# Patient Record
Sex: MALE | Race: WHITE | NOT HISPANIC OR LATINO | Employment: FULL TIME | URBAN - METROPOLITAN AREA
[De-identification: names, ages, dates, MRNs, and addresses within clinical notes are randomized per-mention and may not be internally consistent; named-entity substitution may affect disease eponyms.]

---

## 2019-01-24 ENCOUNTER — OFFICE VISIT (OUTPATIENT)
Dept: GASTROENTEROLOGY | Facility: CLINIC | Age: 29
End: 2019-01-24

## 2019-01-24 VITALS
HEIGHT: 65 IN | BODY MASS INDEX: 40.85 KG/M2 | DIASTOLIC BLOOD PRESSURE: 74 MMHG | WEIGHT: 245.2 LBS | SYSTOLIC BLOOD PRESSURE: 126 MMHG

## 2019-01-24 DIAGNOSIS — K62.89 ANAL OR RECTAL PAIN: Primary | ICD-10-CM

## 2019-01-24 DIAGNOSIS — K63.5 POLYP OF COLON, UNSPECIFIED PART OF COLON, UNSPECIFIED TYPE: ICD-10-CM

## 2019-01-24 DIAGNOSIS — K62.5 RECTAL BLEEDING: ICD-10-CM

## 2019-01-24 PROCEDURE — 99203 OFFICE O/P NEW LOW 30 MIN: CPT | Performed by: INTERNAL MEDICINE

## 2019-01-24 RX ORDER — ATORVASTATIN CALCIUM 20 MG/1
TABLET, FILM COATED ORAL
COMMUNITY
Start: 2019-01-19

## 2019-01-24 RX ORDER — ASPIRIN 81 MG/1
TABLET, COATED ORAL
COMMUNITY
Start: 2019-01-18

## 2019-01-24 RX ORDER — BUPROPION HYDROCHLORIDE 150 MG/1
TABLET ORAL
COMMUNITY
Start: 2019-01-19

## 2019-01-24 RX ORDER — OMEPRAZOLE 40 MG/1
CAPSULE, DELAYED RELEASE ORAL
COMMUNITY
Start: 2017-08-01

## 2019-01-24 RX ORDER — LISINOPRIL AND HYDROCHLOROTHIAZIDE 12.5; 1 MG/1; MG/1
TABLET ORAL
COMMUNITY
Start: 2017-12-06

## 2019-01-24 RX ORDER — FENOFIBRATE 160 MG/1
160 TABLET ORAL DAILY
COMMUNITY
End: 2020-08-03

## 2019-01-24 RX ORDER — SODIUM, POTASSIUM,MAG SULFATES 17.5-3.13G
1 SOLUTION, RECONSTITUTED, ORAL ORAL EVERY 12 HOURS
Qty: 2 BOTTLE | Refills: 0 | Status: ON HOLD | OUTPATIENT
Start: 2019-01-24 | End: 2019-02-22

## 2019-01-24 RX ORDER — SUMATRIPTAN 100 MG/1
100 TABLET, FILM COATED ORAL
COMMUNITY

## 2019-01-24 RX ORDER — METFORMIN HYDROCHLORIDE 500 MG/1
TABLET, EXTENDED RELEASE ORAL
COMMUNITY
Start: 2019-01-18

## 2019-01-24 NOTE — PROGRESS NOTES
PATIENT INFORMATION  Rajinder Velasquez-Buck       - 1990    CHIEF COMPLAINT  Chief Complaint   Patient presents with   • Rectal Bleeding   • Rectal Pain       HISTORY OF PRESENT ILLNESS  HPI    27 yo with history of colon polyps in 2010, maternal grandmother with colon cancer. He has been having issues with rectal pain and bleeding intermittently for years. Symptoms worse with sitting . He has tried otc prep.H and wipes with little help.  BM are daily.  Severity is moderate   He was diagnosed with type 2 diabetes for 3 years  No family history of uc or crohns.  REVIEW OF SYSTEMS  Review of Systems   Respiratory: Positive for apnea.    Gastrointestinal: Positive for anal bleeding and rectal pain.   Neurological: Positive for headaches.   All other systems reviewed and are negative.        ACTIVE PROBLEMS  There are no active problems to display for this patient.        PAST MEDICAL HISTORY  Past Medical History:   Diagnosis Date   • Diabetes mellitus (CMS/HCC)    • GERD (gastroesophageal reflux disease)    • Hyperlipidemia    • Hypertension          SURGICAL HISTORY  Past Surgical History:   Procedure Laterality Date   • COLONOSCOPY     • HERNIA REPAIR           FAMILY HISTORY  Family History   Problem Relation Age of Onset   • Colon cancer Maternal Grandfather          SOCIAL HISTORY  Social History     Occupational History   • Not on file   Tobacco Use   • Smoking status: Never Smoker   • Smokeless tobacco: Never Used   Substance and Sexual Activity   • Alcohol use: No     Frequency: Never   • Drug use: No   • Sexual activity: Not on file       Debilities/Disabilities Identified: None    Emotional Behavior: Appropriate    CURRENT MEDICATIONS    Current Outpatient Medications:   •  fenofibrate 160 MG tablet, Take 160 mg by mouth Daily., Disp: , Rfl:   •  Insulin Degludec-Liraglutide (XULTOPHY) 100-3.6 UNIT-MG/ML solution pen-injector, Inject  under the skin into the appropriate area as directed., Disp: ,  "Rfl:   •  lisinopril-hydrochlorothiazide (PRINZIDE,ZESTORETIC) 10-12.5 MG per tablet, lisinopril 10 mg-hydrochlorothiazide 12.5 mg tablet TAKE 1 TABLET BY MOUTH ONE TIME A DAY, Disp: , Rfl:   •  omeprazole (priLOSEC) 40 MG capsule, omeprazole 40 mg capsule,delayed release TAKE 1 CAPSULE BY MOUTH ONE TIME A DAY, Disp: , Rfl:   •  SUMAtriptan (IMITREX) 100 MG tablet, Take one tablet at onset of headache. May repeat dose one time in 2 hours if headache not relieved. Take one tablet at onset of headache. May repeat dose one time in 2 hours if headache not relieved., Disp: , Rfl:   •  ASPIRIN LOW DOSE 81 MG EC tablet, , Disp: , Rfl:   •  atorvastatin (LIPITOR) 20 MG tablet, , Disp: , Rfl:   •  buPROPion XL (WELLBUTRIN XL) 150 MG 24 hr tablet, , Disp: , Rfl:   •  hydrocortisone-pramoxine (ANALPRAM HC) 2.5-1 % rectal cream, Insert  into the rectum 3 (Three) Times a Day., Disp: 30 g, Rfl: 1  •  metFORMIN ER (GLUCOPHAGE-XR) 500 MG 24 hr tablet, , Disp: , Rfl:     ALLERGIES  Erythromycin    VITALS  Vitals:    01/24/19 1200   BP: 126/74   Weight: 111 kg (245 lb 3.2 oz)   Height: 165.1 cm (65\")       LAST RESULTS   No results found for any previous visit.     No results found.    PHYSICAL EXAM  Physical Exam   Constitutional: He is oriented to person, place, and time. He appears well-developed and well-nourished. No distress.   HENT:   Head: Normocephalic and atraumatic.   Eyes: EOM are normal. Pupils are equal, round, and reactive to light.   Neck: Neck supple. No tracheal deviation present.   Cardiovascular: Normal rate, regular rhythm, normal heart sounds and intact distal pulses. Exam reveals no gallop and no friction rub.   No murmur heard.  Pulmonary/Chest: Effort normal and breath sounds normal. No respiratory distress. He has no wheezes. He has no rales. He exhibits no tenderness.   Abdominal: Soft. Bowel sounds are normal. He exhibits no distension. There is no tenderness. There is no rebound and no guarding. "   Musculoskeletal: He exhibits no edema.   Lymphadenopathy:     He has no cervical adenopathy.   Neurological: He is alert and oriented to person, place, and time.   Skin: Skin is warm. He is not diaphoretic. No erythema.   Psychiatric: He has a normal mood and affect. His behavior is normal. Judgment and thought content normal.   Nursing note and vitals reviewed.      ASSESSMENT  Diagnoses and all orders for this visit:    Anal or rectal pain  -     Case Request; Standing  -     Case Request    Rectal bleeding  -     Case Request; Standing  -     Case Request    Polyp of colon, unspecified part of colon, unspecified type  -     Case Request; Standing  -     Case Request    Other orders  -     atorvastatin (LIPITOR) 20 MG tablet;   -     ASPIRIN LOW DOSE 81 MG EC tablet;   -     buPROPion XL (WELLBUTRIN XL) 150 MG 24 hr tablet;   -     lisinopril-hydrochlorothiazide (PRINZIDE,ZESTORETIC) 10-12.5 MG per tablet; lisinopril 10 mg-hydrochlorothiazide 12.5 mg tablet TAKE 1 TABLET BY MOUTH ONE TIME A DAY  -     metFORMIN ER (GLUCOPHAGE-XR) 500 MG 24 hr tablet;   -     omeprazole (priLOSEC) 40 MG capsule; omeprazole 40 mg capsule,delayed release TAKE 1 CAPSULE BY MOUTH ONE TIME A DAY  -     SUMAtriptan (IMITREX) 100 MG tablet; Take one tablet at onset of headache. May repeat dose one time in 2 hours if headache not relieved. Take one tablet at onset of headache. May repeat dose one time in 2 hours if headache not relieved.  -     fenofibrate 160 MG tablet; Take 160 mg by mouth Daily.  -     Insulin Degludec-Liraglutide (XULTOPHY) 100-3.6 UNIT-MG/ML solution pen-injector; Inject  under the skin into the appropriate area as directed.  -     hydrocortisone-pramoxine (ANALPRAM HC) 2.5-1 % rectal cream; Insert  into the rectum 3 (Three) Times a Day.  -     Follow Anesthesia Guidelines / Standing Orders; Future  -     Implement Anesthesia orders day of procedure.; Standing  -     Obtain informed consent;  Standing          PLAN  No Follow-up on file.      High fiber diet discussed.   Water  rx sent for analpram        Indications, risks and procedure were discussed with the patient, including but not limited to, bleeding, infection, possibility of perforation and possible polypectomy. All of the patient's questions were answered, and signed informed consent was obtained and placed on the chart.

## 2019-02-21 ENCOUNTER — ANESTHESIA EVENT (OUTPATIENT)
Dept: PERIOP | Facility: HOSPITAL | Age: 29
End: 2019-02-21

## 2019-02-22 ENCOUNTER — ANESTHESIA (OUTPATIENT)
Dept: PERIOP | Facility: HOSPITAL | Age: 29
End: 2019-02-22

## 2019-02-22 ENCOUNTER — HOSPITAL ENCOUNTER (OUTPATIENT)
Facility: HOSPITAL | Age: 29
Setting detail: HOSPITAL OUTPATIENT SURGERY
Discharge: HOME | End: 2019-02-22
Attending: INTERNAL MEDICINE | Admitting: INTERNAL MEDICINE

## 2019-02-22 VITALS
DIASTOLIC BLOOD PRESSURE: 74 MMHG | SYSTOLIC BLOOD PRESSURE: 118 MMHG | OXYGEN SATURATION: 95 % | HEIGHT: 65 IN | HEART RATE: 85 BPM | RESPIRATION RATE: 16 BRPM | TEMPERATURE: 98.3 F | BODY MASS INDEX: 40.29 KG/M2 | WEIGHT: 241.8 LBS

## 2019-02-22 DIAGNOSIS — K63.5 POLYP OF COLON, UNSPECIFIED PART OF COLON, UNSPECIFIED TYPE: ICD-10-CM

## 2019-02-22 DIAGNOSIS — K62.89 ANAL OR RECTAL PAIN: ICD-10-CM

## 2019-02-22 DIAGNOSIS — K62.5 RECTAL BLEEDING: ICD-10-CM

## 2019-02-22 LAB
GLUCOSE BLDC GLUCOMTR-MCNC: 119 MG/DL (ref 70–130)
POTASSIUM BLD-SCNC: 4 MMOL/L (ref 3.5–5.2)

## 2019-02-22 PROCEDURE — 45380 COLONOSCOPY AND BIOPSY: CPT | Performed by: INTERNAL MEDICINE

## 2019-02-22 PROCEDURE — 84132 ASSAY OF SERUM POTASSIUM: CPT | Performed by: INTERNAL MEDICINE

## 2019-02-22 PROCEDURE — 25010000002 PROPOFOL 10 MG/ML EMULSION: Performed by: NURSE ANESTHETIST, CERTIFIED REGISTERED

## 2019-02-22 PROCEDURE — 82962 GLUCOSE BLOOD TEST: CPT

## 2019-02-22 PROCEDURE — 88305 TISSUE EXAM BY PATHOLOGIST: CPT | Performed by: INTERNAL MEDICINE

## 2019-02-22 RX ORDER — LIDOCAINE HYDROCHLORIDE 10 MG/ML
INJECTION, SOLUTION INFILTRATION; PERINEURAL AS NEEDED
Status: DISCONTINUED | OUTPATIENT
Start: 2019-02-22 | End: 2019-02-22 | Stop reason: SURG

## 2019-02-22 RX ORDER — SODIUM CHLORIDE, SODIUM LACTATE, POTASSIUM CHLORIDE, CALCIUM CHLORIDE 600; 310; 30; 20 MG/100ML; MG/100ML; MG/100ML; MG/100ML
9 INJECTION, SOLUTION INTRAVENOUS CONTINUOUS
Status: DISCONTINUED | OUTPATIENT
Start: 2019-02-22 | End: 2019-02-22 | Stop reason: HOSPADM

## 2019-02-22 RX ORDER — IBUPROFEN 800 MG/1
800 TABLET ORAL ONCE AS NEEDED
Status: COMPLETED | OUTPATIENT
Start: 2019-02-22 | End: 2019-02-22

## 2019-02-22 RX ORDER — SODIUM CHLORIDE 0.9 % (FLUSH) 0.9 %
3 SYRINGE (ML) INJECTION EVERY 12 HOURS SCHEDULED
Status: DISCONTINUED | OUTPATIENT
Start: 2019-02-22 | End: 2019-02-22 | Stop reason: HOSPADM

## 2019-02-22 RX ORDER — GLYCOPYRROLATE 0.2 MG/ML
INJECTION INTRAMUSCULAR; INTRAVENOUS AS NEEDED
Status: DISCONTINUED | OUTPATIENT
Start: 2019-02-22 | End: 2019-02-22 | Stop reason: SURG

## 2019-02-22 RX ORDER — SODIUM CHLORIDE 0.9 % (FLUSH) 0.9 %
1-10 SYRINGE (ML) INJECTION AS NEEDED
Status: DISCONTINUED | OUTPATIENT
Start: 2019-02-22 | End: 2019-02-22 | Stop reason: HOSPADM

## 2019-02-22 RX ORDER — PROPOFOL 10 MG/ML
VIAL (ML) INTRAVENOUS CONTINUOUS PRN
Status: DISCONTINUED | OUTPATIENT
Start: 2019-02-22 | End: 2019-02-22 | Stop reason: SURG

## 2019-02-22 RX ORDER — SODIUM CHLORIDE 9 MG/ML
40 INJECTION, SOLUTION INTRAVENOUS AS NEEDED
Status: DISCONTINUED | OUTPATIENT
Start: 2019-02-22 | End: 2019-02-22 | Stop reason: HOSPADM

## 2019-02-22 RX ORDER — LIDOCAINE HYDROCHLORIDE 10 MG/ML
0.5 INJECTION, SOLUTION EPIDURAL; INFILTRATION; INTRACAUDAL; PERINEURAL ONCE AS NEEDED
Status: COMPLETED | OUTPATIENT
Start: 2019-02-22 | End: 2019-02-22

## 2019-02-22 RX ORDER — PROPOFOL 10 MG/ML
VIAL (ML) INTRAVENOUS AS NEEDED
Status: DISCONTINUED | OUTPATIENT
Start: 2019-02-22 | End: 2019-02-22 | Stop reason: SURG

## 2019-02-22 RX ADMIN — GLYCOPYRROLATE 0.1 MG: 0.2 INJECTION INTRAMUSCULAR; INTRAVENOUS at 11:00

## 2019-02-22 RX ADMIN — PROPOFOL 40 MG: 10 INJECTION, EMULSION INTRAVENOUS at 11:29

## 2019-02-22 RX ADMIN — SODIUM CHLORIDE, POTASSIUM CHLORIDE, SODIUM LACTATE AND CALCIUM CHLORIDE 9 ML/HR: 600; 310; 30; 20 INJECTION, SOLUTION INTRAVENOUS at 10:02

## 2019-02-22 RX ADMIN — PROPOFOL 100 MCG/KG/MIN: 10 INJECTION, EMULSION INTRAVENOUS at 11:01

## 2019-02-22 RX ADMIN — LIDOCAINE HYDROCHLORIDE 0.5 ML: 10 INJECTION, SOLUTION EPIDURAL; INFILTRATION; INTRACAUDAL; PERINEURAL at 10:02

## 2019-02-22 RX ADMIN — PROPOFOL 50 MG: 10 INJECTION, EMULSION INTRAVENOUS at 11:14

## 2019-02-22 RX ADMIN — IBUPROFEN 800 MG: 800 TABLET ORAL at 12:31

## 2019-02-22 RX ADMIN — PROPOFOL 40 MG: 10 INJECTION, EMULSION INTRAVENOUS at 11:24

## 2019-02-22 RX ADMIN — PROPOFOL 40 MG: 10 INJECTION, EMULSION INTRAVENOUS at 11:34

## 2019-02-22 RX ADMIN — LIDOCAINE HYDROCHLORIDE 50 MG: 10 INJECTION, SOLUTION INFILTRATION; PERINEURAL at 11:00

## 2019-02-22 RX ADMIN — PROPOFOL 50 MG: 10 INJECTION, EMULSION INTRAVENOUS at 11:07

## 2019-02-22 RX ADMIN — PROPOFOL 50 MG: 10 INJECTION, EMULSION INTRAVENOUS at 11:18

## 2019-02-22 RX ADMIN — PROPOFOL 50 MG: 10 INJECTION, EMULSION INTRAVENOUS at 11:01

## 2019-02-22 NOTE — ANESTHESIA POSTPROCEDURE EVALUATION
Patient: Rajinder Menjivar    Procedure Summary     Date:  02/22/19 Room / Location:  [facility] ENDOSCOPY 2 /  [facility]    Anesthesia Start:  1059 Anesthesia Stop:  1142    Procedure:  COLONOSCOPY with polypectomy (N/A ) Diagnosis:       Anal or rectal pain      Rectal bleeding      Polyp of colon, unspecified part of colon, unspecified type      (Anal or rectal pain [K62.89])      (Rectal bleeding [K62.5])      (Polyp of colon, unspecified part of colon, unspecified type [K63.5])    Surgeon:  Kiki West MD Provider:  Shahbaz Yepez CRNA    Anesthesia Type:  MAC ASA Status:  3          Anesthesia Type: MAC  Last vitals  BP   105/92 (02/22/19 0929)   Temp   98.3 °F (36.8 °C) (02/22/19 0929)   Pulse   84 (02/22/19 0929)   Resp   16 (02/22/19 0929)     SpO2   96 % (02/22/19 0929)     Post Anesthesia Care and Evaluation    Patient location during evaluation: PHASE II  Patient participation: complete - patient participated  Level of consciousness: awake and alert  Pain score: 0  Pain management: adequate  Airway patency: patent  Anesthetic complications: No anesthetic complications  PONV Status: none  Cardiovascular status: acceptable  Respiratory status: acceptable  Hydration status: acceptable

## 2019-02-22 NOTE — ANESTHESIA PREPROCEDURE EVALUATION
Anesthesia Evaluation     Patient summary reviewed and Nursing notes reviewed   NPO Solid Status: > 8 hours  NPO Liquid Status: > 8 hours           Airway   Mallampati: III  TM distance: >3 FB  Neck ROM: full  Possible difficult intubation and Large neck circumference  Dental - normal exam     Pulmonary     breath sounds clear to auscultation  (+) sleep apnea ( nightly cpap) on CPAP,   Cardiovascular   Exercise tolerance: good (4-7 METS)    Rhythm: regular  Rate: normal    (+) hypertension well controlled, hyperlipidemia,       Neuro/Psych  (+) psychiatric history Anxiety,     GI/Hepatic/Renal/Endo    (+)  GERD well controlled, GI bleeding ( rectal bleeding), diabetes mellitus type 2 well controlled,     Musculoskeletal (-) negative ROS    Abdominal    Substance History - negative use     OB/GYN negative ob/gyn ROS         Other - negative ROS           Phys Exam Other: Full beard                Anesthesia Plan    ASA 3     MAC     intravenous induction   Anesthetic plan, all risks, benefits, and alternatives have been provided, discussed and informed consent has been obtained with: patient.  Use of blood products discussed with patient  Consented to blood products.

## 2019-02-22 NOTE — OP NOTE
Patient Name:  Rajinder Menjivar  YOB: 1990    Date of Procedure:  2/22/2019    Procedure Performed: Colonoscopy  Indications: Rectal bleeding, rectal pain    Pre-op Diagnosis:   Anal or rectal pain [K62.89]  Rectal bleeding [K62.5]  Polyp of colon, unspecified part of colon, unspecified type [K63.5]    Post-Op Diagnosis Codes:     * Anal or rectal pain [K62.89]     * Rectal bleeding [K62.5]     * Polyp of colon, unspecified part of colon, unspecified type [K63.5]         Staff:  Surgeon(s):  Kiki West MD         Consent: Procedure colonoscopy indications, risks and procedure were discussed with the patient, including but not limited to, bleeding, infection, possibility of perforation and possible polypectomy. All of the patient's questions were answered, and signed informed consent was obtained and placed on the chart.      Sedation: Sedation was provided by anesthesia.      Estimated Blood Loss: minimal    Specimens:   ID Type Source Tests Collected by Time   A : Ascending colon polyp x1 Polyp Large Intestine, Right / Ascending Colon TISSUE PATHOLOGY EXAM Kiki West MD 2/22/2019 1119   B : Sigmoid Colon Polyp x1 Polyp Large Intestine, Sigmoid Colon TISSUE PATHOLOGY EXAM Kiki West MD 2/22/2019 1132         Description of Procedure:   After excellent sedation a digital rectal examination is performed the colonoscope was inserted into the rectum passed to the cecum.  The cecum was identified by both the ileocecal valve and the appendiceal orifice.  The overall bowel preparation was fair.  Upon withdrawal the scope careful examination of mucosa was made.  Pertinent findings include a single ascending colon polyp that was about 3-4 mm sessile removed using forceps and a single sigmoid polyp that was about 3 mm sessile removed using forceps.  Scope was slowly withdrawn to the distal sigmoid colon.  He had some excessive coughing there at that time such that the scope was pushed  out of the patient and landed on the floor.  So another scope was then used to complete the procedure.  The scope was slowly withdrawn to the rectum retroflex or internal hemorrhoids are noted.  The scope was straightened and then slowly withdrawn out throughout the anal canal.  Upon external examination a bleeding anal fissure is noted at the 12 o'clock position.        Findings:   Anal fissure  Internal hemorrhoids  Colon polyps removed using forceps  We will await biopsy results  We will send prescription to compound pharmacy for anal fissure.  Sitz baths and stool softeners are recommended.  He will see me back in the office in follow-up in 2-3 weeks.  A repeat colonoscopy will be recommended for 5 years for surveillance purposes    Complications: None        Kiki West MD     Date: 2/22/2019  Time: 12:00 PM

## 2019-02-22 NOTE — H&P
PATIENT INFORMATION  Rajinder Gold Ron-Buck         - 1990     CHIEF COMPLAINT      Chief Complaint   Patient presents with   • Rectal Bleeding   • Rectal Pain         HISTORY OF PRESENT ILLNESS  HPI     27 yo with history of colon polyps in 2010, maternal grandmother with colon cancer. He has been having issues with rectal pain and bleeding intermittently for years. Symptoms worse with sitting . He has tried otc prep.H and wipes with little help.  BM are daily.  Severity is moderate   He was diagnosed with type 2 diabetes for 3 years  No family history of uc or crohns.  REVIEW OF SYSTEMS  Review of Systems   Respiratory: Positive for apnea.    Gastrointestinal: Positive for anal bleeding and rectal pain.   Neurological: Positive for headaches.   All other systems reviewed and are negative.           ACTIVE PROBLEMS  There are no active problems to display for this patient.           PAST MEDICAL HISTORY  Medical History        Past Medical History:   Diagnosis Date   • Diabetes mellitus (CMS/HCC)     • GERD (gastroesophageal reflux disease)     • Hyperlipidemia     • Hypertension                 SURGICAL HISTORY  Surgical History         Past Surgical History:   Procedure Laterality Date   • COLONOSCOPY       • HERNIA REPAIR                   FAMILY HISTORY        Family History   Problem Relation Age of Onset   • Colon cancer Maternal Grandfather              SOCIAL HISTORY  Social History            Occupational History   • Not on file   Tobacco Use   • Smoking status: Never Smoker   • Smokeless tobacco: Never Used   Substance and Sexual Activity   • Alcohol use: No       Frequency: Never   • Drug use: No   • Sexual activity: Not on file         Debilities/Disabilities Identified: None     Emotional Behavior: Appropriate     CURRENT MEDICATIONS     Current Outpatient Medications:   •  fenofibrate 160 MG tablet, Take 160 mg by mouth Daily., Disp: , Rfl:   •  Insulin Degludec-Liraglutide (XULTOPHY) 100-3.6  "UNIT-MG/ML solution pen-injector, Inject  under the skin into the appropriate area as directed., Disp: , Rfl:   •  lisinopril-hydrochlorothiazide (PRINZIDE,ZESTORETIC) 10-12.5 MG per tablet, lisinopril 10 mg-hydrochlorothiazide 12.5 mg tablet TAKE 1 TABLET BY MOUTH ONE TIME A DAY, Disp: , Rfl:   •  omeprazole (priLOSEC) 40 MG capsule, omeprazole 40 mg capsule,delayed release TAKE 1 CAPSULE BY MOUTH ONE TIME A DAY, Disp: , Rfl:   •  SUMAtriptan (IMITREX) 100 MG tablet, Take one tablet at onset of headache. May repeat dose one time in 2 hours if headache not relieved. Take one tablet at onset of headache. May repeat dose one time in 2 hours if headache not relieved., Disp: , Rfl:   •  ASPIRIN LOW DOSE 81 MG EC tablet, , Disp: , Rfl:   •  atorvastatin (LIPITOR) 20 MG tablet, , Disp: , Rfl:   •  buPROPion XL (WELLBUTRIN XL) 150 MG 24 hr tablet, , Disp: , Rfl:   •  hydrocortisone-pramoxine (ANALPRAM HC) 2.5-1 % rectal cream, Insert  into the rectum 3 (Three) Times a Day., Disp: 30 g, Rfl: 1  •  metFORMIN ER (GLUCOPHAGE-XR) 500 MG 24 hr tablet, , Disp: , Rfl:      ALLERGIES  Erythromycin     VITALS  /92 (BP Location: Left arm, Patient Position: Lying)   Pulse 84   Temp 98.3 °F (36.8 °C) (Oral)   Resp 16   Ht 165.1 cm (65\")   Wt 110 kg (241 lb 12.8 oz)   SpO2 96%   BMI 40.24 kg/m²               LAST RESULTS           No results found for any previous visit.      No results found.     PHYSICAL EXAM  Physical Exam   Constitutional: He is oriented to person, place, and time. He appears well-developed and well-nourished. No distress.   HENT:   Head: Normocephalic and atraumatic.   Eyes: EOM are normal. Pupils are equal, round, and reactive to light.   Neck: Neck supple. No tracheal deviation present.   Cardiovascular: Normal rate, regular rhythm, normal heart sounds and intact distal pulses. Exam reveals no gallop and no friction rub.   No murmur heard.  Pulmonary/Chest: Effort normal and breath sounds normal. No " respiratory distress. He has no wheezes. He has no rales. He exhibits no tenderness.   Abdominal: Soft. Bowel sounds are normal. He exhibits no distension. There is no tenderness. There is no rebound and no guarding.   Musculoskeletal: He exhibits no edema.   Lymphadenopathy:     He has no cervical adenopathy.   Neurological: He is alert and oriented to person, place, and time.   Skin: Skin is warm. He is not diaphoretic. No erythema.   Psychiatric: He has a normal mood and affect. His behavior is normal. Judgment and thought content normal.   Nursing note and vitals reviewed.        ASSESSMENT  Diagnoses and all orders for this visit:     Anal or rectal pain  -     Case Request; Standing  -     Case Request     Rectal bleeding  -     Case Request; Standing  -     Case Request     Polyp of colon, unspecified part of colon, unspecified type  -     Case Request; Standing  -     Case Request     Other orders  -     atorvastatin (LIPITOR) 20 MG tablet;   -     ASPIRIN LOW DOSE 81 MG EC tablet;   -     buPROPion XL (WELLBUTRIN XL) 150 MG 24 hr tablet;   -     lisinopril-hydrochlorothiazide (PRINZIDE,ZESTORETIC) 10-12.5 MG per tablet; lisinopril 10 mg-hydrochlorothiazide 12.5 mg tablet TAKE 1 TABLET BY MOUTH ONE TIME A DAY  -     metFORMIN ER (GLUCOPHAGE-XR) 500 MG 24 hr tablet;   -     omeprazole (priLOSEC) 40 MG capsule; omeprazole 40 mg capsule,delayed release TAKE 1 CAPSULE BY MOUTH ONE TIME A DAY  -     SUMAtriptan (IMITREX) 100 MG tablet; Take one tablet at onset of headache. May repeat dose one time in 2 hours if headache not relieved. Take one tablet at onset of headache. May repeat dose one time in 2 hours if headache not relieved.  -     fenofibrate 160 MG tablet; Take 160 mg by mouth Daily.  -     Insulin Degludec-Liraglutide (XULTOPHY) 100-3.6 UNIT-MG/ML solution pen-injector; Inject  under the skin into the appropriate area as directed.  -     hydrocortisone-pramoxine (ANALPRAM HC) 2.5-1 % rectal cream;  Insert  into the rectum 3 (Three) Times a Day.  -     Follow Anesthesia Guidelines / Standing Orders; Future  -     Implement Anesthesia orders day of procedure.; Standing  -     Obtain informed consent; Standing              PLAN  No Follow-up on file.        High fiber diet discussed.   Water  rx sent for analpram           Indications, risks and procedure were discussed with the patient, including but not limited to, bleeding, infection, possibility of perforation and possible polypectomy. All of the patient's questions were answered, and signed informed consent was obtained and placed on the chart.

## 2019-02-26 LAB
CYTO UR: NORMAL
LAB AP CASE REPORT: NORMAL
PATH REPORT.FINAL DX SPEC: NORMAL
PATH REPORT.GROSS SPEC: NORMAL

## 2019-05-22 ENCOUNTER — OFFICE VISIT (OUTPATIENT)
Dept: GASTROENTEROLOGY | Facility: CLINIC | Age: 29
End: 2019-05-22

## 2019-05-22 VITALS
BODY MASS INDEX: 41.42 KG/M2 | SYSTOLIC BLOOD PRESSURE: 122 MMHG | DIASTOLIC BLOOD PRESSURE: 76 MMHG | HEIGHT: 65 IN | WEIGHT: 248.6 LBS

## 2019-05-22 DIAGNOSIS — K60.2 ANAL FISSURE: ICD-10-CM

## 2019-05-22 DIAGNOSIS — K63.5 POLYP OF COLON, UNSPECIFIED PART OF COLON, UNSPECIFIED TYPE: ICD-10-CM

## 2019-05-22 DIAGNOSIS — K64.8 INTERNAL HEMORRHOIDS: Primary | ICD-10-CM

## 2019-05-22 PROCEDURE — 99213 OFFICE O/P EST LOW 20 MIN: CPT | Performed by: INTERNAL MEDICINE

## 2019-05-22 RX ORDER — HYDROCORTISONE ACETATE 25 MG/1
25 SUPPOSITORY RECTAL 2 TIMES DAILY PRN
Qty: 30 SUPPOSITORY | Refills: 1 | Status: SHIPPED | OUTPATIENT
Start: 2019-05-22 | End: 2020-08-03

## 2019-05-22 NOTE — PROGRESS NOTES
PATIENT INFORMATION  Rajinder Velasquez-Buck       - 1990    CHIEF COMPLAINT  Chief Complaint   Patient presents with   • Diarrhea   • Hemorrhoids       HISTORY OF PRESENT ILLNESS  HPI    Final Diagnosis   1. Ascending Colon, Biopsy:               A. Tubular adenoma.               B. Negative for high-grade dysplasia.     2. Sigmoid Colon, Biopsy:               A. Hyperplastic polyp.     He is here today in follow-up after his colonoscopy.  He is accompanied by his partner.  In review his colonoscopy revealed evidence of internal hemorrhoids and anal fissure.  He also had several polyps removed.  Pathology as above.  One was adenomatous.  He was given a prescription for too bad cream for his anal fissure.  He states that about 1 week after being on this cream he had increasing pain and noticed a large bulge in the rectal area.  He stopped taking the cream.  He does feel better today.  He is not having any further pain or bleeding.  Bowel movements are once daily and are soft.  He states he has not been participating in anal intercourse in a long time as he has had chronic issues with hemorrhoids.     REVIEW OF SYSTEMS  Review of Systems   Gastrointestinal: Positive for anal bleeding, diarrhea and rectal pain.   Neurological: Positive for headaches.   All other systems reviewed and are negative.        ACTIVE PROBLEMS  Patient Active Problem List    Diagnosis   • Anal or rectal pain [K62.89]   • Rectal bleeding [K62.5]   • Polyp of colon [K63.5]         PAST MEDICAL HISTORY  Past Medical History:   Diagnosis Date   • Diabetes mellitus (CMS/HCC)    • GERD (gastroesophageal reflux disease)    • Hyperlipidemia    • Hypertension    • Polyp of colon 2019         SURGICAL HISTORY  Past Surgical History:   Procedure Laterality Date   • COLONOSCOPY     • COLONOSCOPY N/A 2019    Procedure: COLONOSCOPY with polypectomy;  Surgeon: Kiki West MD;  Location: [facility];  Service: Gastroenterology   •  HERNIA REPAIR     • TONSILLECTOMY     • WISDOM TOOTH EXTRACTION           FAMILY HISTORY  Family History   Problem Relation Age of Onset   • Colon cancer Maternal Grandfather          SOCIAL HISTORY  Social History     Occupational History   • Not on file   Tobacco Use   • Smoking status: Never Smoker   • Smokeless tobacco: Never Used   Substance and Sexual Activity   • Alcohol use: No     Frequency: Never   • Drug use: No   • Sexual activity: Not on file       Debilities/Disabilities Identified: None    Emotional Behavior: Appropriate    CURRENT MEDICATIONS    Current Outpatient Medications:   •  ASPIRIN LOW DOSE 81 MG EC tablet, , Disp: , Rfl:   •  atorvastatin (LIPITOR) 20 MG tablet, , Disp: , Rfl:   •  buPROPion XL (WELLBUTRIN XL) 150 MG 24 hr tablet, , Disp: , Rfl:   •  fenofibrate 160 MG tablet, Take 160 mg by mouth Daily., Disp: , Rfl:   •  hydrocortisone (ANUSOL-HC) 25 MG suppository, Insert 1 suppository into the rectum 2 (Two) Times a Day As Needed for Hemorrhoids (rectal discomfort)., Disp: 30 suppository, Rfl: 1  •  hydrocortisone-pramoxine (ANALPRAM HC) 2.5-1 % rectal cream, Insert  into the rectum 3 (Three) Times a Day., Disp: 30 g, Rfl: 1  •  Insulin Degludec-Liraglutide (XULTOPHY) 100-3.6 UNIT-MG/ML solution pen-injector, Inject  under the skin into the appropriate area as directed., Disp: , Rfl:   •  lisinopril-hydrochlorothiazide (PRINZIDE,ZESTORETIC) 10-12.5 MG per tablet, lisinopril 10 mg-hydrochlorothiazide 12.5 mg tablet TAKE 1 TABLET BY MOUTH ONE TIME A DAY, Disp: , Rfl:   •  metFORMIN ER (GLUCOPHAGE-XR) 500 MG 24 hr tablet, , Disp: , Rfl:   •  omeprazole (priLOSEC) 40 MG capsule, omeprazole 40 mg capsule,delayed release TAKE 1 CAPSULE BY MOUTH ONE TIME A DAY, Disp: , Rfl:   •  SUMAtriptan (IMITREX) 100 MG tablet, Take one tablet at onset of headache. May repeat dose one time in 2 hours if headache not relieved. Take one tablet at onset of headache. May repeat dose one time in 2 hours if  "headache not relieved., Disp: , Rfl:     ALLERGIES  Erythromycin    VITALS  Vitals:    05/22/19 1506   BP: 122/76   Weight: 113 kg (248 lb 9.6 oz)   Height: 165.1 cm (65\")       LAST RESULTS   Admission on 02/22/2019, Discharged on 02/22/2019   Component Date Value Ref Range Status   • Potassium 02/22/2019 4.0  3.5 - 5.2 mmol/L Final   • Glucose 02/22/2019 119  70 - 130 mg/dL Final   • Case Report 02/22/2019    Final                    Value:Surgical Pathology Report                         Case: [id number]                                  Authorizing Provider:  Kiki West MD         Collected:           02/22/2019 11:19 AM          Ordering Location:     [facility]   Received:            02/22/2019 12:33 PM                                 OR                                                                           Pathologist:           Ifrah Sincliar MD                                                    Specimens:   1) - Large Intestine, Right / Ascending Colon, Ascending colon polyp x1                             2) - Large Intestine, Sigmoid Colon, Sigmoid Colon Polyp x1                               • Final Diagnosis 02/22/2019    Final                    Value:This result contains rich text formatting which cannot be displayed here.   • Gross Description 02/22/2019    Final                    Value:This result contains rich text formatting which cannot be displayed here.   • Microscopic Description 02/22/2019    Final                    Value:This result contains rich text formatting which cannot be displayed here.     No results found.    PHYSICAL EXAM  Physical Exam   Constitutional: He is oriented to person, place, and time. He appears well-developed and well-nourished. No distress.   HENT:   Head: Normocephalic and atraumatic.   Eyes: EOM are normal. Pupils are equal, round, and reactive to light.   Neck: Neck supple. No tracheal deviation present.   Cardiovascular: Normal rate, " regular rhythm, normal heart sounds and intact distal pulses. Exam reveals no gallop and no friction rub.   No murmur heard.  Pulmonary/Chest: Effort normal and breath sounds normal. No respiratory distress. He has no wheezes. He has no rales. He exhibits no tenderness.   Abdominal: Soft. Bowel sounds are normal. He exhibits no distension. There is no tenderness. There is no rebound and no guarding.   Musculoskeletal: He exhibits no edema.   Lymphadenopathy:     He has no cervical adenopathy.   Neurological: He is alert and oriented to person, place, and time.   Skin: Skin is warm. He is not diaphoretic. No erythema.   Psychiatric: He has a normal mood and affect. His behavior is normal. Judgment and thought content normal.   Nursing note and vitals reviewed.      ASSESSMENT  Diagnoses and all orders for this visit:    Internal hemorrhoids    Anal fissure    Other orders  -     hydrocortisone (ANUSOL-HC) 25 MG suppository; Insert 1 suppository into the rectum 2 (Two) Times a Day As Needed for Hemorrhoids (rectal discomfort).          PLAN  No Follow-up on file.      Ok to do fiber  For now, stop 2BAD cream as symptoms have resolved   stool softeners as needed, noah gallagher    If not better, can refer to hemorrhoid clinic in [place].    Repeat colon in 5 years.    Trial of anusol hc.

## 2020-08-03 ENCOUNTER — OFFICE VISIT (OUTPATIENT)
Dept: SURGERY | Facility: CLINIC | Age: 30
End: 2020-08-03

## 2020-08-03 VITALS
TEMPERATURE: 98.2 F | RESPIRATION RATE: 16 BRPM | BODY MASS INDEX: 41.32 KG/M2 | WEIGHT: 248 LBS | HEART RATE: 80 BPM | SYSTOLIC BLOOD PRESSURE: 128 MMHG | HEIGHT: 65 IN | DIASTOLIC BLOOD PRESSURE: 74 MMHG

## 2020-08-03 DIAGNOSIS — Z86.010 HISTORY OF COLON POLYPS: ICD-10-CM

## 2020-08-03 DIAGNOSIS — K64.8 BLEEDING INTERNAL HEMORRHOIDS: Primary | ICD-10-CM

## 2020-08-03 PROCEDURE — 99203 OFFICE O/P NEW LOW 30 MIN: CPT | Performed by: SURGERY

## 2020-08-03 RX ORDER — HYDROCORTISONE 25 MG/G
CREAM TOPICAL
Qty: 30 G | Refills: 1 | Status: SHIPPED | OUTPATIENT
Start: 2020-08-03 | End: 2021-08-03

## 2020-08-03 RX ORDER — EZETIMIBE 10 MG/1
10 TABLET ORAL DAILY
COMMUNITY

## 2020-08-03 NOTE — PROGRESS NOTES
"Rajinder Gold Ron-Buck 30 y.o. male presents @ the req of [facility] for eval of hemorrhoids.  Pt reports 10 yr H/O hemorrhoids.  Pt reports they cycle from improving then get worse.  + bleeding hemorrhoids.  Pt has tried Anusol HC supp = no help.  Also tried a \"compound cream\"  that was Rx'd per PCP and states it made him sick to his stomach.     Chief Complaint   Patient presents with   • Hemorrhoids             HPI     Above-noted and agree.  This very pleasant 30-year-old male is here to discuss hemorrhoids.  He had a colonoscopy last year by Dr. Rodgers and had an adenomatous ascending colon polyp as well as a anal fissure and internal hemorrhoids.  He presents today with complaints of bleeding hemorrhoids.  He moves his bowels easily when he remembers to use his fiber but when he does not remember uses fiber he strains.  He has a family history of colon cancer.  He has never used tobacco products.  He has no other complaints.    Review of Systems   All other systems reviewed and are negative.            Current Outpatient Medications:   •  ezetimibe (ZETIA) 10 MG tablet, Take 10 mg by mouth Daily., Disp: , Rfl:   •  ASPIRIN LOW DOSE 81 MG EC tablet, , Disp: , Rfl:   •  atorvastatin (LIPITOR) 20 MG tablet, , Disp: , Rfl:   •  buPROPion XL (WELLBUTRIN XL) 150 MG 24 hr tablet, , Disp: , Rfl:   •  Hydrocortisone, Perianal, (Anusol-HC) 2.5 % rectal cream, Apply rectally 2 times daily, Disp: 30 g, Rfl: 1  •  Insulin Degludec-Liraglutide (XULTOPHY) 100-3.6 UNIT-MG/ML solution pen-injector, Inject  under the skin into the appropriate area as directed., Disp: , Rfl:   •  lisinopril-hydrochlorothiazide (PRINZIDE,ZESTORETIC) 10-12.5 MG per tablet, lisinopril 10 mg-hydrochlorothiazide 12.5 mg tablet TAKE 1 TABLET BY MOUTH ONE TIME A DAY, Disp: , Rfl:   •  metFORMIN ER (GLUCOPHAGE-XR) 500 MG 24 hr tablet, , Disp: , Rfl:   •  omeprazole (priLOSEC) 40 MG capsule, omeprazole 40 mg capsule,delayed release TAKE 1 CAPSULE " BY MOUTH ONE TIME A DAY, Disp: , Rfl:   •  SUMAtriptan (IMITREX) 100 MG tablet, Take one tablet at onset of headache. May repeat dose one time in 2 hours if headache not relieved. Take one tablet at onset of headache. May repeat dose one time in 2 hours if headache not relieved., Disp: , Rfl:         Allergies   Allergen Reactions   • Erythromycin Nausea And Vomiting           Past Medical History:   Diagnosis Date   • Diabetes mellitus (CMS/HCC)    • GERD (gastroesophageal reflux disease)    • Hyperlipidemia    • Hypertension    • Polyp of colon 1/24/2019           Past Surgical History:   Procedure Laterality Date   • COLONOSCOPY     • COLONOSCOPY N/A 2/22/2019    Procedure: COLONOSCOPY with polypectomy;  Surgeon: Kiki West MD;  Location: [facility];  Service: Gastroenterology   • HERNIA REPAIR     • TONSILLECTOMY     • WISDOM TOOTH EXTRACTION             Social History     Tobacco Use   • Smoking status: Never Smoker   • Smokeless tobacco: Never Used   Substance Use Topics   • Alcohol use: No     Frequency: Never   • Drug use: No             There is no immunization history on file for this patient.        Physical Exam   Constitutional: He is oriented to person, place, and time. He appears well-developed and well-nourished.   HENT:   Head: Normocephalic and atraumatic.   Right Ear: External ear normal.   Left Ear: External ear normal.   Cardiovascular: Normal rate and regular rhythm.   Pulmonary/Chest: Effort normal and breath sounds normal.   Abdominal: Soft. Bowel sounds are normal.   Genitourinary:   Genitourinary Comments: External anal exam is negative   Musculoskeletal: He exhibits no edema or deformity.   Neurological: He is alert and oriented to person, place, and time.   Skin: Skin is warm and dry.   Psychiatric: He has a normal mood and affect. His behavior is normal.   Nursing note and vitals reviewed.      Debilities/Disabilities Identified: None    Emotional Behavior: Appropriate      /74  "  Pulse 80   Temp 98.2 °F (36.8 °C)   Resp 16   Ht 165.1 cm (65\")   Wt 112 kg (248 lb)   BMI 41.27 kg/m²         Rajinder was seen today for hemorrhoids.    Diagnoses and all orders for this visit:    Bleeding internal hemorrhoids    History of colon polyps    Other orders  -     Hydrocortisone, Perianal, (Anusol-HC) 2.5 % rectal cream; Apply rectally 2 times daily    Since Dr. Rodgers is no longer with [facility], we will add him to our 3-year repeat colonoscopy list.  We will begin Metamucil and our conservative hemorrhoid treatments and bring him back in 2 weeks.    Thank you for allowing me to participate in the care of this interesting patient.        "

## 2020-10-05 ENCOUNTER — OFFICE VISIT (OUTPATIENT)
Dept: SURGERY | Facility: CLINIC | Age: 30
End: 2020-10-05

## 2020-10-05 VITALS
HEIGHT: 65 IN | DIASTOLIC BLOOD PRESSURE: 76 MMHG | BODY MASS INDEX: 41.32 KG/M2 | RESPIRATION RATE: 20 BRPM | SYSTOLIC BLOOD PRESSURE: 122 MMHG | HEART RATE: 72 BPM | WEIGHT: 248 LBS | TEMPERATURE: 98.2 F

## 2020-10-05 DIAGNOSIS — Z86.010 HISTORY OF COLON POLYPS: ICD-10-CM

## 2020-10-05 DIAGNOSIS — K62.5 RECTAL BLEEDING: Primary | ICD-10-CM

## 2020-10-05 PROCEDURE — 99214 OFFICE O/P EST MOD 30 MIN: CPT | Performed by: SURGERY

## 2020-10-05 NOTE — PROGRESS NOTES
Rajinder Gold Ron-Buck 30 y.o. male presents for  FU hemorrhoids.  Pt reports no improvement.  Pt would like surgery information and discuss possible surgery later due to busy schedule.       HPI   Above-noted agree.  Andre is here to discuss the THD procedure.  He is still having a lot of bleeding and tightness of the anal area.  After reviewing his records he did have a tubular adenoma removed in 2019.  We discussed repeating his colonoscopy prior to doing the THD procedure.  He is tolerating a regular diet without nausea or vomiting.  He has no chest pain or shortness of breath.  He has no fevers or chills.  He has no other complaints.      Review of Systems   All other systems reviewed and are negative.          Past Medical History:   Diagnosis Date   • Diabetes mellitus (CMS/HCC)    • GERD (gastroesophageal reflux disease)    • Hyperlipidemia    • Hypertension    • Polyp of colon 1/24/2019           Past Surgical History:   Procedure Laterality Date   • COLONOSCOPY     • COLONOSCOPY N/A 2/22/2019    Procedure: COLONOSCOPY with polypectomy;  Surgeon: Kiki West MD;  Location: [facility];  Service: Gastroenterology   • HERNIA REPAIR     • TONSILLECTOMY     • WISDOM TOOTH EXTRACTION             Physical Exam  Vitals signs and nursing note reviewed.   Constitutional:       Appearance: Normal appearance.   HENT:      Head: Normocephalic and atraumatic.   Cardiovascular:      Rate and Rhythm: Normal rate and regular rhythm.   Pulmonary:      Effort: Pulmonary effort is normal.      Breath sounds: Normal breath sounds.   Abdominal:      General: Bowel sounds are normal.      Palpations: Abdomen is soft.   Musculoskeletal:         General: No swelling or tenderness.   Skin:     General: Skin is warm and dry.   Neurological:      General: No focal deficit present.      Mental Status: He is alert and oriented to person, place, and time.   Psychiatric:         Mood and Affect: Mood normal.         Behavior: Behavior  "normal.         No debilities noted    /76   Pulse 72   Temp 98.2 °F (36.8 °C)   Resp 20   Ht 165.1 cm (65\")   Wt 112 kg (248 lb)   BMI 41.27 kg/m²         Rajinder was seen today for hemorrhoids.    Diagnoses and all orders for this visit:    Rectal bleeding    History of colon polyps    We will get Andre scheduled for a colonoscopy.  I discussed with the patient the benefits and risks of performing endoscopy.  Benefits and risks were not limited to but including bleeding, infection, perforation, complications of anesthesia, aspiration.  The patient appeared to understand and is willing to proceed.    Thank you for allowing me to participate in the care of this interesting patient.        "

## 2020-10-05 NOTE — H&P
Rajinder Gold Ron-Buck 30 y.o. male presents for  FU hemorrhoids.  Pt reports no improvement.  Pt would like surgery information and discuss possible surgery later due to busy schedule.       HPI   Above-noted agree.  Andre is here to discuss the THD procedure.  He is still having a lot of bleeding and tightness of the anal area.  After reviewing his records he did have a tubular adenoma removed in 2019.  We discussed repeating his colonoscopy prior to doing the THD procedure.  He is tolerating a regular diet without nausea or vomiting.  He has no chest pain or shortness of breath.  He has no fevers or chills.  He has no other complaints.      Review of Systems   All other systems reviewed and are negative.          Past Medical History:   Diagnosis Date   • Diabetes mellitus (CMS/HCC)    • GERD (gastroesophageal reflux disease)    • Hyperlipidemia    • Hypertension    • Polyp of colon 1/24/2019           Past Surgical History:   Procedure Laterality Date   • COLONOSCOPY     • COLONOSCOPY N/A 2/22/2019    Procedure: COLONOSCOPY with polypectomy;  Surgeon: Kiki West MD;  Location: [facility];  Service: Gastroenterology   • HERNIA REPAIR     • TONSILLECTOMY     • WISDOM TOOTH EXTRACTION             Physical Exam  Vitals signs and nursing note reviewed.   Constitutional:       Appearance: Normal appearance.   HENT:      Head: Normocephalic and atraumatic.   Cardiovascular:      Rate and Rhythm: Normal rate and regular rhythm.   Pulmonary:      Effort: Pulmonary effort is normal.      Breath sounds: Normal breath sounds.   Abdominal:      General: Bowel sounds are normal.      Palpations: Abdomen is soft.   Musculoskeletal:         General: No swelling or tenderness.   Skin:     General: Skin is warm and dry.   Neurological:      General: No focal deficit present.      Mental Status: He is alert and oriented to person, place, and time.   Psychiatric:         Mood and Affect: Mood normal.         Behavior: Behavior  "normal.         No debilities noted    /76   Pulse 72   Temp 98.2 °F (36.8 °C)   Resp 20   Ht 165.1 cm (65\")   Wt 112 kg (248 lb)   BMI 41.27 kg/m²         Rajinder was seen today for hemorrhoids.    Diagnoses and all orders for this visit:    Rectal bleeding    History of colon polyps    We will get Andre scheduled for a colonoscopy.  I discussed with the patient the benefits and risks of performing endoscopy.  Benefits and risks were not limited to but including bleeding, infection, perforation, complications of anesthesia, aspiration.  The patient appeared to understand and is willing to proceed.    Thank you for allowing me to participate in the care of this interesting patient.          "

## 2020-11-10 ENCOUNTER — TRANSCRIBE ORDERS (OUTPATIENT)
Dept: ADMINISTRATIVE | Facility: HOSPITAL | Age: 30
End: 2020-11-10

## 2020-11-10 DIAGNOSIS — U07.1 COVID-19: Primary | ICD-10-CM

## 2020-11-21 ENCOUNTER — LAB (OUTPATIENT)
Dept: LAB | Facility: HOSPITAL | Age: 30
End: 2020-11-21

## 2020-11-21 DIAGNOSIS — U07.1 COVID-19: ICD-10-CM

## 2020-11-21 PROCEDURE — C9803 HOPD COVID-19 SPEC COLLECT: HCPCS

## 2020-11-21 PROCEDURE — U0004 COV-19 TEST NON-CDC HGH THRU: HCPCS | Performed by: OBSTETRICS & GYNECOLOGY

## 2020-11-23 ENCOUNTER — ANESTHESIA EVENT (OUTPATIENT)
Dept: PERIOP | Facility: HOSPITAL | Age: 30
End: 2020-11-23

## 2020-11-23 LAB — SARS-COV-2 RNA RESP QL NAA+PROBE: NOT DETECTED

## 2020-11-24 ENCOUNTER — HOSPITAL ENCOUNTER (OUTPATIENT)
Facility: HOSPITAL | Age: 30
Setting detail: HOSPITAL OUTPATIENT SURGERY
Discharge: HOME | End: 2020-11-24
Attending: SURGERY | Admitting: SURGERY

## 2020-11-24 ENCOUNTER — ANESTHESIA (OUTPATIENT)
Dept: PERIOP | Facility: HOSPITAL | Age: 30
End: 2020-11-24

## 2020-11-24 VITALS
DIASTOLIC BLOOD PRESSURE: 87 MMHG | HEART RATE: 93 BPM | BODY MASS INDEX: 39.9 KG/M2 | OXYGEN SATURATION: 98 % | RESPIRATION RATE: 14 BRPM | SYSTOLIC BLOOD PRESSURE: 113 MMHG | WEIGHT: 239.8 LBS | TEMPERATURE: 98.7 F

## 2020-11-24 DIAGNOSIS — Z86.010 HISTORY OF COLON POLYPS: ICD-10-CM

## 2020-11-24 DIAGNOSIS — K62.5 RECTAL BLEEDING: ICD-10-CM

## 2020-11-24 LAB
GLUCOSE BLDC GLUCOMTR-MCNC: 139 MG/DL (ref 70–130)
QT INTERVAL: 328 MS

## 2020-11-24 PROCEDURE — 45380 COLONOSCOPY AND BIOPSY: CPT | Performed by: SURGERY

## 2020-11-24 PROCEDURE — 93010 ELECTROCARDIOGRAM REPORT: CPT | Performed by: INTERNAL MEDICINE

## 2020-11-24 PROCEDURE — 93005 ELECTROCARDIOGRAM TRACING: CPT | Performed by: NURSE ANESTHETIST, CERTIFIED REGISTERED

## 2020-11-24 PROCEDURE — 25010000002 PROPOFOL 10 MG/ML EMULSION: Performed by: ANESTHESIOLOGY

## 2020-11-24 PROCEDURE — 88305 TISSUE EXAM BY PATHOLOGIST: CPT | Performed by: SURGERY

## 2020-11-24 PROCEDURE — 82962 GLUCOSE BLOOD TEST: CPT

## 2020-11-24 RX ORDER — LIDOCAINE HYDROCHLORIDE 10 MG/ML
0.5 INJECTION, SOLUTION EPIDURAL; INFILTRATION; INTRACAUDAL; PERINEURAL ONCE AS NEEDED
Status: DISCONTINUED | OUTPATIENT
Start: 2020-11-24 | End: 2020-11-24 | Stop reason: HOSPADM

## 2020-11-24 RX ORDER — DEXMEDETOMIDINE HYDROCHLORIDE 100 UG/ML
INJECTION, SOLUTION INTRAVENOUS AS NEEDED
Status: DISCONTINUED | OUTPATIENT
Start: 2020-11-24 | End: 2020-11-24 | Stop reason: SURG

## 2020-11-24 RX ORDER — SODIUM CHLORIDE 0.9 % (FLUSH) 0.9 %
10 SYRINGE (ML) INJECTION AS NEEDED
Status: DISCONTINUED | OUTPATIENT
Start: 2020-11-24 | End: 2020-11-24 | Stop reason: HOSPADM

## 2020-11-24 RX ORDER — SODIUM CHLORIDE 0.9 % (FLUSH) 0.9 %
10 SYRINGE (ML) INJECTION EVERY 12 HOURS SCHEDULED
Status: DISCONTINUED | OUTPATIENT
Start: 2020-11-24 | End: 2020-11-24 | Stop reason: HOSPADM

## 2020-11-24 RX ORDER — SODIUM CHLORIDE, SODIUM LACTATE, POTASSIUM CHLORIDE, CALCIUM CHLORIDE 600; 310; 30; 20 MG/100ML; MG/100ML; MG/100ML; MG/100ML
9 INJECTION, SOLUTION INTRAVENOUS CONTINUOUS
Status: DISCONTINUED | OUTPATIENT
Start: 2020-11-24 | End: 2020-11-24 | Stop reason: HOSPADM

## 2020-11-24 RX ORDER — PROPOFOL 10 MG/ML
VIAL (ML) INTRAVENOUS AS NEEDED
Status: DISCONTINUED | OUTPATIENT
Start: 2020-11-24 | End: 2020-11-24 | Stop reason: SURG

## 2020-11-24 RX ORDER — SODIUM CHLORIDE 9 MG/ML
40 INJECTION, SOLUTION INTRAVENOUS AS NEEDED
Status: DISCONTINUED | OUTPATIENT
Start: 2020-11-24 | End: 2020-11-24 | Stop reason: HOSPADM

## 2020-11-24 RX ORDER — LIDOCAINE HYDROCHLORIDE 20 MG/ML
INJECTION, SOLUTION INFILTRATION; PERINEURAL AS NEEDED
Status: DISCONTINUED | OUTPATIENT
Start: 2020-11-24 | End: 2020-11-24 | Stop reason: SURG

## 2020-11-24 RX ORDER — GLYCOPYRROLATE 0.2 MG/ML
INJECTION INTRAMUSCULAR; INTRAVENOUS AS NEEDED
Status: DISCONTINUED | OUTPATIENT
Start: 2020-11-24 | End: 2020-11-24 | Stop reason: SURG

## 2020-11-24 RX ADMIN — SODIUM CHLORIDE, POTASSIUM CHLORIDE, SODIUM LACTATE AND CALCIUM CHLORIDE 9 ML/HR: 600; 310; 30; 20 INJECTION, SOLUTION INTRAVENOUS at 07:54

## 2020-11-24 RX ADMIN — PROPOFOL 280 MG: 10 INJECTION, EMULSION INTRAVENOUS at 08:02

## 2020-11-24 RX ADMIN — GLYCOPYRROLATE 0.1 MG: 0.2 INJECTION INTRAMUSCULAR; INTRAVENOUS at 07:59

## 2020-11-24 RX ADMIN — LIDOCAINE HYDROCHLORIDE 60 MG: 20 INJECTION, SOLUTION INFILTRATION; PERINEURAL at 08:02

## 2020-11-24 RX ADMIN — DEXMEDETOMIDINE HYDROCHLORIDE 16 MCG: 100 INJECTION, SOLUTION, CONCENTRATE INTRAVENOUS at 08:03

## 2020-11-24 NOTE — OP NOTE
Colonoscopy Procedure Note      Pre-operative Diagnosis: Rectal bleeding    Post-operative Diagnosis: Polyp of the cecum    Procedure: Colonoscopy with polypectomy using cold forceps    Surgeon: Elder    Anesthetic: Poornima Zhao MD    Estimated Blood Loss: Minimal    Complications: None    Indications: Rectal bleeding    Findings/Treatments:   Cecum polyp x1-removed cold forceps       Scope Withdrawal Time:  6 minutes      Recommendations: Await pathology      Procedure Details     After discussing the benefits and risks of the procedure with the patient, not limited to but including:  Bleeding, infection, perforation, aspiration; informed consent was signed.  The patient was taken into the endoscopy room at [facility] and placed in the left lateral decubitus position.  MAC anesthesia was given with appropriate cardiopulmonary monitoring.  A rectal exam was performed.  Sphincter tone was normal.  The colonoscope was then inserted and carefully advanced to the cecum while visualizing the mucosa.  The cecum was identified by the ileocecal valve and the orifice of the appendix.  Once in the cecum we noticed a small polyp that was removed with cold forceps.  The scope was then slowly withdrawn while carefully evaluating the mucosa and deflating air.  There were no more abnormalities noted from rectum to cecum.  Once in the rectum the scope was retroflexed straightened and removed.  Andre was then taken to the recovery area in stable postoperative condition having tolerated his procedure well.    Cynthia Friedman DO

## 2020-11-24 NOTE — ANESTHESIA PREPROCEDURE EVALUATION
Anesthesia Evaluation     Patient summary reviewed and Nursing notes reviewed   no history of anesthetic complications:  NPO Solid Status: > 8 hours  NPO Liquid Status: > 8 hours           Airway   Mallampati: III  TM distance: >3 FB  Neck ROM: full  Possible difficult intubation and Large neck circumference  Dental - normal exam     Pulmonary - normal exam    breath sounds clear to auscultation  (+) sleep apnea on CPAP,   Cardiovascular - normal exam  Exercise tolerance: good (4-7 METS)    Rhythm: regular  Rate: normal    (+) hypertension well controlled, hyperlipidemia,       Neuro/Psych- negative ROS  GI/Hepatic/Renal/Endo    (+) obesity,  GERD well controlled,  diabetes mellitus (Last A1c ~8) type 2 poorly controlled,     Musculoskeletal (-) negative ROS    Abdominal   (+) obese,    Substance History - negative use     OB/GYN          Other - negative ROS                       Anesthesia Plan    ASA 3     MAC       Anesthetic plan, all risks, benefits, and alternatives have been provided, discussed and informed consent has been obtained with: patient.  Use of blood products discussed with patient  Consented to blood products.

## 2020-11-24 NOTE — ANESTHESIA POSTPROCEDURE EVALUATION
Patient: Rajinder Menjivar    Procedure Summary     Date: 11/24/20 Room / Location: [facility] ENDOSCOPY 1 /  [facility]    Anesthesia Start: 0756 Anesthesia Stop: 0820    Procedure: Colonoscopy with polypectomy (N/A ) Diagnosis:       Rectal bleeding      History of colon polyps      (Rectal bleeding [K62.5])      (History of colon polyps [Z86.010])    Surgeon: Cynthia Friedman DO Provider: Poornima Keys MD    Anesthesia Type: MAC ASA Status: 3          Anesthesia Type: MAC    Vitals  Vitals Value Taken Time   BP 97/72 11/24/20 0834   Temp 98.7 °F (37.1 °C) 11/24/20 0834   Pulse 105 11/24/20 0834   Resp 14 11/24/20 0834   SpO2             Post Anesthesia Care and Evaluation    Patient location during evaluation: PHASE II  Patient participation: complete - patient participated  Level of consciousness: awake  Pain score: 0  Pain management: adequate  Airway patency: patent  Anesthetic complications: No anesthetic complications  PONV Status: none  Cardiovascular status: acceptable  Respiratory status: acceptable  Hydration status: acceptable

## 2020-11-24 NOTE — H&P
Rajinder Gold Ron-Buck 30 y.o. male presents for  FU hemorrhoids.  Pt reports no improvement.  Pt would like surgery information and discuss possible surgery later due to busy schedule.       HPI   Above-noted agree.  Andre is here to discuss the THD procedure.  He is still having a lot of bleeding and tightness of the anal area.  After reviewing his records he did have a tubular adenoma removed in 2019.  We discussed repeating his colonoscopy prior to doing the THD procedure.  He is tolerating a regular diet without nausea or vomiting.  He has no chest pain or shortness of breath.  He has no fevers or chills.  He has no other complaints.      Review of Systems   All other systems reviewed and are negative.          Past Medical History:   Diagnosis Date   • Diabetes mellitus (CMS/HCC)    • GERD (gastroesophageal reflux disease)    • Hyperlipidemia    • Hypertension    • Polyp of colon 1/24/2019           Past Surgical History:   Procedure Laterality Date   • COLONOSCOPY     • COLONOSCOPY N/A 2/22/2019    Procedure: COLONOSCOPY with polypectomy;  Surgeon: Kiki West MD;  Location: [facility];  Service: Gastroenterology   • HERNIA REPAIR     • TONSILLECTOMY     • WISDOM TOOTH EXTRACTION             Physical Exam  Vitals signs and nursing note reviewed.   Constitutional:       Appearance: Normal appearance.   HENT:      Head: Normocephalic and atraumatic.   Cardiovascular:      Rate and Rhythm: Normal rate and regular rhythm.   Pulmonary:      Effort: Pulmonary effort is normal.      Breath sounds: Normal breath sounds.   Abdominal:      General: Bowel sounds are normal.      Palpations: Abdomen is soft.   Musculoskeletal:         General: No swelling or tenderness.   Skin:     General: Skin is warm and dry.   Neurological:      General: No focal deficit present.      Mental Status: He is alert and oriented to person, place, and time.   Psychiatric:         Mood and Affect: Mood normal.         Behavior: Behavior  "normal.         No debilities noted    /76   Pulse 72   Temp 98.2 °F (36.8 °C)   Resp 20   Ht 165.1 cm (65\")   Wt 112 kg (248 lb)   BMI 41.27 kg/m²         Rajinder was seen today for hemorrhoids.    Diagnoses and all orders for this visit:    Rectal bleeding    History of colon polyps    We will get Andre scheduled for a colonoscopy.  I discussed with the patient the benefits and risks of performing endoscopy.  Benefits and risks were not limited to but including bleeding, infection, perforation, complications of anesthesia, aspiration.  The patient appeared to understand and is willing to proceed.    Thank you for allowing me to participate in the care of this interesting patient.          "

## 2020-12-02 ENCOUNTER — OFFICE VISIT (OUTPATIENT)
Dept: SURGERY | Facility: CLINIC | Age: 30
End: 2020-12-02

## 2020-12-02 VITALS
TEMPERATURE: 97.7 F | DIASTOLIC BLOOD PRESSURE: 82 MMHG | SYSTOLIC BLOOD PRESSURE: 124 MMHG | HEART RATE: 72 BPM | BODY MASS INDEX: 39.82 KG/M2 | WEIGHT: 239 LBS | RESPIRATION RATE: 16 BRPM | HEIGHT: 65 IN

## 2020-12-02 DIAGNOSIS — D12.2 ADENOMATOUS POLYP OF ASCENDING COLON: Primary | ICD-10-CM

## 2020-12-02 PROCEDURE — 99213 OFFICE O/P EST LOW 20 MIN: CPT | Performed by: SURGERY

## 2020-12-02 NOTE — PROGRESS NOTES
"Rajinder Velasquez-Buck 30 y.o. male presents for PO FU c-scope and discuss THD.      HPI   Above-noted agree.  Andre is here following his colonoscopy.  He had an adenomatous polyp of the cecum.  When we retroflexed the scope in his rectum there were no internal hemorrhoids noted.  On discussion was that he does not always have bleeding or pain.  It is most likely associated with difficulty moving his bowels.  He does take the Metamucil occasionally but does not take it daily as he should.  He has not been having any rectal pain or bleeding lately.  He is tolerating a regular diet without nausea or vomiting.  He has no complaints.      Review of Systems        Past Medical History:   Diagnosis Date   • Diabetes mellitus (CMS/HCC)    • GERD (gastroesophageal reflux disease)    • Hyperlipidemia    • Hypertension    • Polyp of colon 1/24/2019           Past Surgical History:   Procedure Laterality Date   • COLONOSCOPY     • COLONOSCOPY N/A 2/22/2019    Procedure: COLONOSCOPY with polypectomy;  Surgeon: Kiki West MD;  Location:  [facility] OR;  Service: Gastroenterology   • COLONOSCOPY N/A 11/24/2020    Procedure: Colonoscopy with polypectomy;  Surgeon: Cynthia Friedman DO;  Location: [facility] OR;  Service: Gastroenterology;  Laterality: N/A;  cecal polyp   • HERNIA REPAIR     • TONSILLECTOMY     • WISDOM TOOTH EXTRACTION             Physical Exam  Constitutional:       Appearance: Normal appearance.   Genitourinary:     Comments: External anal exam is negative  Musculoskeletal:         General: No swelling or tenderness.   Skin:     General: Skin is warm and dry.   Neurological:      General: No focal deficit present.      Mental Status: He is alert and oriented to person, place, and time.   Psychiatric:         Mood and Affect: Mood normal.         Behavior: Behavior normal.             /82   Pulse 72   Temp 97.7 °F (36.5 °C)   Resp 16   Ht 165.1 cm (65\")   Wt 108 kg (239 lb)   BMI 39.77 kg/m² "         Diagnoses and all orders for this visit:    1. Adenomatous polyp of ascending colon (Primary)    We had a long discussion regarding dietary changes.  I discussed with him that hemorrhoids are a normal part of anatomy but we do not do surgery on them until they become abnormal and unable to be handled with conservative therapy.  At this time there were no internal or external hemorrhoids noted and I do not believe surgery benefit him.  We discussed Metamucil and drinking lots of water.  He will need a 3-year repeat colonoscopy.  We discussed return to clinic anytime as needed.    Thank you for allowing me to participate in the care of this interesting patient.

## 2022-02-22 ENCOUNTER — TELEPHONE (OUTPATIENT)
Dept: SURGERY | Facility: CLINIC | Age: 32
End: 2022-02-22

## (undated) DEVICE — Device: Brand: DEFENDO AIR/WATER/SUCTION AND BIOPSY VALVE

## (undated) DEVICE — SPNG GZ WOVN 4X4IN 12PLY 10/BX STRL

## (undated) DEVICE — VIAL FORMALIN CAP 10P 40ML

## (undated) DEVICE — SUCTION CANISTER, 3000CC,SAFELINER: Brand: DEROYAL

## (undated) DEVICE — FRCP BX RADJAW4 NDL 2.8 240CM LG OG BX40

## (undated) DEVICE — GOWN ISOL W/THUMB UNIV BLU BX/15

## (undated) DEVICE — BW-412T DISP COMBO CLEANING BRUSH: Brand: SINGLE USE COMBINATION CLEANING BRUSH

## (undated) DEVICE — Device

## (undated) DEVICE — ENDO. PORT CONNECTOR W/VALVE FOR OLYMPUS® SCOPES: Brand: ERBE

## (undated) DEVICE — SYR LL 3CC

## (undated) DEVICE — HYBRID TUBING/CAP SET FOR OLYMPUS® SCOPES: Brand: ERBE

## (undated) DEVICE — MASK,FACE,FLUID RESIST,SHLD,EARLOOP: Brand: MEDLINE

## (undated) DEVICE — GLV SURG SENSICARE MICRO PF LF 6 STRL

## (undated) DEVICE — JACKT LAB KNIT COLR LG BLU

## (undated) DEVICE — KT ORCA ORCAPOD DISP STRL